# Patient Record
Sex: FEMALE | Race: ASIAN | URBAN - METROPOLITAN AREA
[De-identification: names, ages, dates, MRNs, and addresses within clinical notes are randomized per-mention and may not be internally consistent; named-entity substitution may affect disease eponyms.]

---

## 2023-06-01 ENCOUNTER — TELEPHONE (OUTPATIENT)
Age: 23
End: 2023-06-01

## 2023-06-01 NOTE — TELEPHONE ENCOUNTER
----- Message from Miguelito Pollard sent at 6/1/2023  3:59 PM EDT -----  Subject: Appointment Request    Reason for Call: New Patient/New to Provider Appointment needed: New   Patient Request Appointment    QUESTIONS    Reason for appointment request? No appointments available during search     Additional Information for Provider?  Patient is needing an appointment   after Grzegorz in December for a new patient appointment.  ---------------------------------------------------------------------------  --------------  [address] [place]  [phone]; OK to leave message on voicemail  ---------------------------------------------------------------------------  --------------  SCRIPT ANSWERS  CHRISTIANOID Screen: Tip Weeks

## 2023-12-28 ENCOUNTER — OFFICE VISIT (OUTPATIENT)
Age: 23
End: 2023-12-28
Payer: COMMERCIAL

## 2023-12-28 VITALS
RESPIRATION RATE: 16 BRPM | DIASTOLIC BLOOD PRESSURE: 70 MMHG | OXYGEN SATURATION: 98 % | HEART RATE: 58 BPM | BODY MASS INDEX: 26.66 KG/M2 | TEMPERATURE: 98.2 F | SYSTOLIC BLOOD PRESSURE: 108 MMHG | WEIGHT: 160 LBS | HEIGHT: 65 IN

## 2023-12-28 DIAGNOSIS — Z00.00 ANNUAL PHYSICAL EXAM: ICD-10-CM

## 2023-12-28 DIAGNOSIS — Z76.89 ENCOUNTER TO ESTABLISH CARE WITH NEW DOCTOR: Primary | ICD-10-CM

## 2023-12-28 PROCEDURE — 99385 PREV VISIT NEW AGE 18-39: CPT | Performed by: FAMILY MEDICINE

## 2023-12-28 NOTE — PROGRESS NOTES
Chief Complaint   Patient presents with    New Patient    Establish Care     1. \"Have you been to the ER, urgent care clinic since your last visit? Hospitalized since your last visit? \" No    2. \"Have you seen or consulted any other health care providers outside of the [address] [place] [address] since your last visit? \" Dr Blanka Ralph pediatrician last seen about a year ago    3. For patients aged 43-73: Has the patient had a colonoscopy / FIT/ Cologuard? NA - based on age      If the patient is female:    4. For patients aged 43-66: Has the patient had a mammogram within the past 2 years? NA - based on age or sex      11. For patients aged 21-65: Has the patient had a pap smear?  Yes - no [facility] [place] present gyn exam 12/23/2021  she will get name of gyn

## 2023-12-28 NOTE — PROGRESS NOTES
Chief Complaint   Patient presents with    New Patient    Establish Care     HISTORY OF PRESENT ILLNESS   HPI  New patient presents to establish care  [facility]  Ceph[place] Dr. Dorinda Forest  Was seeing him once a year for annual checkups, last visit ~   No chronic health conditions  No significant PMH  No major health concerns or complaints at this time  Takes no rx medications  Diet: regular  Caffeine: 1 cup of tea, no coffee or sodas  Exercise: rides bike 2 x a week x 30 minutes, swims 1 x a week x 30 minutes  Sees gyn for well woman visits/gyn exams  Last pap reportedly normal   Never been sexually active  Regular, normal periods  Single  No children  Graduated from IB Program at [employer] from [employer] , majored in [employer]  Currently lives and works in [address] for an [employer] in [address]   Constitutional: Negative. HENT: Negative. Eyes: Negative. Respiratory: Negative. Cardiovascular: Negative. Gastrointestinal: Negative. Endocrine: Negative. Genitourinary: Negative. Musculoskeletal: Negative. Allergic/Immunologic: Negative. Neurological: Negative. PROBLEM LIST/MEDICAL HISTORY   There are no problems to display for this patient. PAST SURGICAL HISTORY     Past Surgical History:   Procedure Laterality Date    WISDOM TOOTH EXTRACTION  01/2017       MEDICATIONS     Current Outpatient Medications   Medication Sig    Cholecalciferol (VITAMIN D3 PO) Take by mouth daily    Multiple Vitamins-Minerals (MULTIPLE VITAMINS/WOMENS PO) Take by mouth daily     No current facility-administered medications for this visit.           ALLERGIES   No Known Allergies       SOCIAL HISTORY     Social History     Tobacco Use    Smoking status: Never    Smokeless tobacco: Never   Substance Use Topics    Alcohol use: Not

## 2024-10-07 ENCOUNTER — TELEPHONE (OUTPATIENT)
Age: 24
End: 2024-10-07

## 2024-10-07 NOTE — TELEPHONE ENCOUNTER
----- Message from ROSS Emily PITTSROSS sent at 10/7/2024  1:00 PM EDT -----  Regarding: FW: [facility] Appointment Request  Please schedule appt.  Thank you.  ----- Message -----  From: Jyotsna Chen  Sent: 10/4/2024   3:07 PM EDT  To: [facility] Clinical Staff  Subject: [facility] Appointment Request                          [facility] Appointment Request    Patient needs appointment for [facility] Appointment Type: Annual Visit.    Patient Requested Dates(s): December 16 - December 27  Patient Requested Time: anytime   Provider Name: Chioma Cordova MD    Reason for Appointment Request: Established Patient - No appointments available during search  --------------------------------------------------------------------------------------------------------------------------    Relationship to Patient: Self     Call Back Information: OK to leave message on voicemail  Preferred Call Back Number: Phone [phone]

## 2024-10-07 NOTE — TELEPHONE ENCOUNTER
Left VM for pt to call office back. If pt calls back she would like to reschedule her CPE that she orginally had 12/26/24 with Dr. HOYOS due to her being out of office. Ok to put two 20 mins slots together for pt since next available CPE is going into 2025.-TM 10/7/24

## 2024-12-18 ENCOUNTER — OFFICE VISIT (OUTPATIENT)
Age: 24
End: 2024-12-18
Payer: COMMERCIAL

## 2024-12-18 VITALS
SYSTOLIC BLOOD PRESSURE: 98 MMHG | RESPIRATION RATE: 18 BRPM | DIASTOLIC BLOOD PRESSURE: 70 MMHG | WEIGHT: 160.8 LBS | BODY MASS INDEX: 26.79 KG/M2 | TEMPERATURE: 97.3 F | HEART RATE: 62 BPM | OXYGEN SATURATION: 100 % | HEIGHT: 65 IN

## 2024-12-18 DIAGNOSIS — Z00.00 ANNUAL PHYSICAL EXAM: Primary | ICD-10-CM

## 2024-12-18 PROCEDURE — 99395 PREV VISIT EST AGE 18-39: CPT | Performed by: FAMILY MEDICINE

## 2024-12-18 ASSESSMENT — PATIENT HEALTH QUESTIONNAIRE - PHQ9
SUM OF ALL RESPONSES TO PHQ QUESTIONS 1-9: 0
SUM OF ALL RESPONSES TO PHQ9 QUESTIONS 1 & 2: 0
2. FEELING DOWN, DEPRESSED OR HOPELESS: NOT AT ALL
SUM OF ALL RESPONSES TO PHQ QUESTIONS 1-9: 0
1. LITTLE INTEREST OR PLEASURE IN DOING THINGS: NOT AT ALL
SUM OF ALL RESPONSES TO PHQ QUESTIONS 1-9: 0
SUM OF ALL RESPONSES TO PHQ QUESTIONS 1-9: 0

## 2024-12-18 ASSESSMENT — ENCOUNTER SYMPTOMS
EYES NEGATIVE: 1
GASTROINTESTINAL NEGATIVE: 1
RESPIRATORY NEGATIVE: 1
ALLERGIC/IMMUNOLOGIC NEGATIVE: 1

## 2024-12-18 NOTE — PROGRESS NOTES
Chief Complaint   Patient presents with    Annual Exam     HISTORY OF PRESENT ILLNESS   HPI  Annual checkup  Overall good general health  No complaints or concerns at this time  Sees GYN for well woman visits, GYN exams, and Pap screenings.  Diet: regular  Caffeine: 1 cup of tea a day, no coffee or sodas  Exercise: runs 3 x a week x 30 minutes  Weight: stable and unchanged from 1 yr ago 160 lbs   REVIEW OF SYMPTOMS   Review of Systems   Constitutional: Negative.    HENT: Negative.     Eyes: Negative.    Respiratory: Negative.     Cardiovascular: Negative.    Gastrointestinal: Negative.    Endocrine: Negative.    Genitourinary: Negative.    Musculoskeletal: Negative.    Allergic/Immunologic: Negative.    Neurological: Negative.    Hematological: Negative.    Psychiatric/Behavioral: Negative.               PROBLEM LIST/MEDICAL HISTORY   There are no problems to display for this patient.          PAST SURGICAL HISTORY     Past Surgical History:   Procedure Laterality Date    WISDOM TOOTH EXTRACTION  01/2017         MEDICATIONS     Current Outpatient Medications   Medication Sig    Cholecalciferol (VITAMIN D3 PO) Take by mouth daily    Multiple Vitamins-Minerals (MULTIPLE VITAMINS/WOMENS PO) Take by mouth daily     No current facility-administered medications for this visit.          ALLERGIES   No Known Allergies       SOCIAL HISTORY     Social History     Tobacco Use    Smoking status: Never     Passive exposure: Past    Smokeless tobacco: Never   Substance Use Topics    Alcohol use: Not Currently     Comment: rarely, on special occasions     Social History     Social History Narrative    New to [facility]     Referred by mother Maryann Martinez who is a [facility] patient    [place] Native    Previous Peds Dr. Sloan        Single    No children    Graduated from IB Program at [employer]    Graduated from [employer] , majored in Marketing/Advertising    Lived and worked in [place] as  for an Advertising

## 2024-12-18 NOTE — PROGRESS NOTES
Chief Complaint   Patient presents with    Annual Exam     \"Have you been to the ER, urgent care clinic since your last visit?  Hospitalized since your last visit?\"    YES - When: approximately 4 months ago.  Where and Why: [facility]    “Have you seen or consulted any other health care providers outside of [facility] since your last visit?”    NO        “Have you had a pap smear?”    NO    No cervical cancer screening on file                 12/18/2024    10:56 AM   PHQ-9    Little interest or pleasure in doing things 0   Feeling down, depressed, or hopeless 0   PHQ-2 Score 0   PHQ-9 Total Score 0           Financial Resource Strain: Low Risk  (12/28/2023)    Overall Financial Resource Strain (CARDIA)     Difficulty of Paying Living Expenses: Not hard at all      Food Insecurity: No Food Insecurity (12/28/2023)    Hunger Vital Sign     Worried About Running Out of Food in the Last Year: Never true     Ran Out of Food in the Last Year: Never true          Health Maintenance Due   Topic Date Due    Chlamydia/GC screen  Never done    Hepatitis C screen  Never done    Pap smear  Never done    Flu vaccine (1) 08/01/2024    Depression Screen  12/28/2024